# Patient Record
Sex: FEMALE | Race: WHITE
[De-identification: names, ages, dates, MRNs, and addresses within clinical notes are randomized per-mention and may not be internally consistent; named-entity substitution may affect disease eponyms.]

---

## 2018-09-28 ENCOUNTER — HOSPITAL ENCOUNTER (OUTPATIENT)
Dept: HOSPITAL 43 - DL.ENDO | Age: 58
Discharge: HOME | End: 2018-09-28
Attending: INTERNAL MEDICINE
Payer: COMMERCIAL

## 2018-09-28 VITALS — SYSTOLIC BLOOD PRESSURE: 140 MMHG | DIASTOLIC BLOOD PRESSURE: 76 MMHG

## 2018-09-28 DIAGNOSIS — K25.9: Primary | ICD-10-CM

## 2018-09-28 DIAGNOSIS — K52.9: ICD-10-CM

## 2018-09-28 DIAGNOSIS — D50.9: ICD-10-CM

## 2018-09-28 DIAGNOSIS — Z91.14: ICD-10-CM

## 2018-09-28 DIAGNOSIS — K44.9: ICD-10-CM

## 2018-09-28 PROCEDURE — 45331 SIGMOIDOSCOPY AND BIOPSY: CPT

## 2018-09-28 PROCEDURE — 87077 CULTURE AEROBIC IDENTIFY: CPT

## 2018-09-28 PROCEDURE — 43239 EGD BIOPSY SINGLE/MULTIPLE: CPT

## 2018-09-28 NOTE — OR
DATE:  09/28/2018

 

PROCEDURE DONE:  Flexible sigmoidoscopy and biopsies.

 

INSTRUMENT USED:  CF-H180AL Olympus video colonoscope.

 

PREMEDICATIONS:  Done after EGD.

 

INDICATION:  The patient with known inflammatory bowel disease with recent

rectal bleeding and iron-deficiency anemia.  Flexible sigmoidoscopic examination

is done for detection of any polypoid lesions and also to assess any evidence

for IBD. Endoscopic hemostasis therapy if needed.  Initial rectal exam was

unremarkable.  Rigid anoscopy was normal.

 

DESCRIPTION OF PROCEDURE:  The colonoscope was passed with ease up to proximal

sigmoid colon.  No bleeding was noted from any of the visualized areas at the

commencement of the examination.  Some solid fecal material was noted.  No

stricture.  No vascular ectasia.  No large or isolated ulcerations seen.  No

evidence of diffuse inflammatory bowel disease in the form of friability,

contact bleeding, or ulcerations.  No polyp or tumor mass identified.  Multiple

pinch biopsies were obtained from the sigmoid and rectal mucosa and sent for

histopathology.  No bleeding was noted from any of the visualized areas at the

completion of examination.

 

IMPRESSION:  Normal study.

 

The patient tolerated the procedure well.

 

DD:  09/28/2018 08:43:52

DT:  09/28/2018 09:06:08

[facility]

Job #:  [id number]

## 2018-09-28 NOTE — OR
DATE:  09/28/2018

 

PROCEDURES PERFORMED:  Esophagogastroduodenoscopy, NBI, and multiple pinch

biopsies and brush biopsy for cytology.

 

INSTRUMENT USED:  GIF-H180 Olympus video panendoscope.

 

PREMEDICATIONS:  No oral or topical anesthesia used.  Fentanyl 100 mcg

intravenous, Versed 2 mg intravenous.

 

The procedure was done under pulse oximetry, BP recording, and cardiac monitor.

 

INDICATION:  The patient with gastrointestinal bleeding and iron-deficiency

anemia.

 

Esophagogastroduodenoscopy is performed for detection of any active erosive

lesions, malignancy also under consideration, H. pylori status to be determined,

small bowel biopsies to be obtained for celiac disease, endoscopic hemostasis

therapy if needed.

 

DESCRIPTION OF PROCEDURE:  The scope was passed with ease.  Adequate

visualization of the esophagus was made from proximal to distal areas.  No upper

esophageal lesions identified.  No distal esophageal stricture.  No uphill or

downhill esophageal varices.  No Whitney-Ramirez tear.  No evidence of erosive

esophagitis by [place] criteria.  No esophageal polyp or tumor mass

identified.  Sliding hiatal hernia was noted.  No proximal gastric varices

noted.  Gastric fundus examination by retroflexion showed no polypoid lesions.

Gastric stump showed no polyp or malignant mass.  No vascular ectasia

identified.  The anastomotic area showed large, benign-appearing ulcer without

bleeding from it.  NBI views were taken.  Photographs were obtained.  Numerous

pinch biopsies, 6 in number, were taken from different areas of the ulcer, and

brush biopsy for cytology was taken.  Visualized loops of the small bowel were

unremarkable.  Multiple pinch biopsies, 4 in number, were taken from different

areas of the duodenum and sent for any histopathologic evidence of celiac

disease.  Multiple pinch biopsies were also taken from the distal and proximal

gastric mucosa and sent for PyloriTek test for H. pylori and histopathology.

Photographs were taken of the gastric fundus and distal esophagus.  No bleeding

was noted from any of the visualized areas at the completion of examination.

 

IMPRESSION:

1. Sliding hiatal hernia.

2. Anastomotic ulcer.

The patient tolerated the procedure well.

 

DD:  09/28/2018 08:43:52

DT:  09/28/2018 09:04:55

[facility]

Job #:  [id number]

## 2022-07-26 ENCOUNTER — HOSPITAL ENCOUNTER (EMERGENCY)
Dept: HOSPITAL 38 - CC.ED | Age: 62
Discharge: HOME | End: 2022-07-26
Payer: MEDICAID

## 2022-07-26 VITALS — DIASTOLIC BLOOD PRESSURE: 75 MMHG | SYSTOLIC BLOOD PRESSURE: 124 MMHG | HEART RATE: 86 BPM

## 2022-07-26 DIAGNOSIS — Z88.8: ICD-10-CM

## 2022-07-26 DIAGNOSIS — K21.9: Primary | ICD-10-CM

## 2022-07-26 DIAGNOSIS — I10: ICD-10-CM

## 2022-07-26 DIAGNOSIS — Z79.899: ICD-10-CM

## 2022-07-26 LAB
CHLORIDE SERPL-SCNC: 106 MEQ/L (ref 98–106)
EGFRCR SERPLBLD CKD-EPI 2021: 72 ML/MIN (ref 60–?)
SODIUM SERPL-SCNC: 141 MEQ/L (ref 136–145)

## 2022-07-26 PROCEDURE — C9113 INJ PANTOPRAZOLE SODIUM, VIA: HCPCS

## 2022-07-26 RX ADMIN — ONDANSETRON ONE PACKET: 4 TABLET, ORALLY DISINTEGRATING ORAL at 17:51

## 2022-07-26 RX ADMIN — ONDANSETRON PRN MG: 2 INJECTION, SOLUTION INTRAMUSCULAR; INTRAVENOUS at 16:57

## 2022-07-26 RX ADMIN — MECLIZINE HYDROCHLORIDE ONE PACKET: 12.5 TABLET ORAL at 17:51
